# Patient Record
Sex: FEMALE | Race: BLACK OR AFRICAN AMERICAN | Employment: FULL TIME | URBAN - NONMETROPOLITAN AREA
[De-identification: names, ages, dates, MRNs, and addresses within clinical notes are randomized per-mention and may not be internally consistent; named-entity substitution may affect disease eponyms.]

---

## 2018-05-04 ENCOUNTER — HOSPITAL ENCOUNTER (EMERGENCY)
Age: 43
Discharge: LEFT BEFORE DISCHARGE | End: 2018-05-04

## 2022-05-01 ENCOUNTER — HOSPITAL ENCOUNTER (EMERGENCY)
Age: 47
Discharge: HOME | End: 2022-05-01
Attending: FAMILY MEDICINE
Payer: COMMERCIAL

## 2022-05-01 VITALS
WEIGHT: 155 LBS | RESPIRATION RATE: 19 BRPM | BODY MASS INDEX: 26.46 KG/M2 | HEIGHT: 64 IN | HEART RATE: 83 BPM | TEMPERATURE: 97.9 F | DIASTOLIC BLOOD PRESSURE: 70 MMHG | OXYGEN SATURATION: 98 % | SYSTOLIC BLOOD PRESSURE: 146 MMHG

## 2022-05-01 DIAGNOSIS — R11.2 NON-INTRACTABLE VOMITING WITH NAUSEA, UNSPECIFIED VOMITING TYPE: ICD-10-CM

## 2022-05-01 DIAGNOSIS — R51.9 ACUTE NONINTRACTABLE HEADACHE, UNSPECIFIED HEADACHE TYPE: Primary | ICD-10-CM

## 2022-05-01 LAB
ALBUMIN SERPL-MCNC: 3.5 GM/DL (ref 3.4–5)
ALP BLD-CCNC: 91 U/L (ref 46–116)
ALT SERPL-CCNC: 21 U/L (ref 14–63)
ANION GAP: 10 MEQ/L (ref 8–16)
AST SERPL-CCNC: 16 U/L (ref 15–37)
BASOPHILS # BLD: 0.9 % (ref 0–3)
BILIRUB SERPL-MCNC: 0.3 MG/DL (ref 0.2–1)
BUN BLDV-MCNC: 19 MG/DL (ref 7–18)
CHLORIDE BLD-SCNC: 108 MEQ/L (ref 98–107)
CO2: 23 MEQ/L (ref 21–32)
CREAT SERPL-MCNC: 0.8 MG/DL (ref 0.6–1.3)
EOSINOPHILS RELATIVE PERCENT: 3.1 % (ref 0–4)
GFR, ESTIMATED: > 90 ML/MIN/1.73M2
GLUCOSE BLD-MCNC: 112 MG/DL (ref 74–106)
HCT VFR BLD CALC: 44.2 % (ref 37–47)
HEMOGLOBIN: 14.9 GM/DL (ref 12–16)
LIPASE: 116 U/L (ref 73–393)
LYMPHOCYTES # BLD: 24.5 % (ref 15–47)
MCH RBC QN AUTO: 31.9 PG (ref 27–31)
MCHC RBC AUTO-ENTMCNC: 33.7 GM/DL (ref 33–37)
MCV RBC AUTO: 94.6 FL (ref 81–99)
MONOCYTES: 7.4 % (ref 0–12)
PDW BLD-RTO: 13 % (ref 11.5–14.5)
PLATELET # BLD: 261 THOU/MM3 (ref 130–400)
PMV BLD AUTO: 8.5 FL (ref 7.4–10.4)
POC CALCIUM: 8.9 MG/DL (ref 8.5–10.1)
POTASSIUM SERPL-SCNC: 4 MEQ/L (ref 3.5–5.1)
RBC # BLD: 4.67 MILL/MM3 (ref 4.2–5.4)
SEGS: 64.1 % (ref 43–75)
SODIUM BLD-SCNC: 141 MEQ/L (ref 136–145)
TOTAL PROTEIN: 6.8 GM/DL (ref 6.4–8.2)
WBC # BLD: 8.5 THOU/MM3 (ref 4.8–10.8)

## 2022-05-01 PROCEDURE — 6360000002 HC RX W HCPCS: Performed by: FAMILY MEDICINE

## 2022-05-01 PROCEDURE — 83690 ASSAY OF LIPASE: CPT

## 2022-05-01 PROCEDURE — 2580000003 HC RX 258: Performed by: FAMILY MEDICINE

## 2022-05-01 PROCEDURE — 96374 THER/PROPH/DIAG INJ IV PUSH: CPT

## 2022-05-01 PROCEDURE — 99284 EMERGENCY DEPT VISIT MOD MDM: CPT

## 2022-05-01 PROCEDURE — 96375 TX/PRO/DX INJ NEW DRUG ADDON: CPT

## 2022-05-01 PROCEDURE — 80053 COMPREHEN METABOLIC PANEL: CPT

## 2022-05-01 PROCEDURE — 85025 COMPLETE CBC W/AUTO DIFF WBC: CPT

## 2022-05-01 RX ORDER — DIPHENHYDRAMINE HYDROCHLORIDE 50 MG/ML
25 INJECTION INTRAMUSCULAR; INTRAVENOUS ONCE
Status: COMPLETED | OUTPATIENT
Start: 2022-05-01 | End: 2022-05-01

## 2022-05-01 RX ORDER — ONDANSETRON 2 MG/ML
4 INJECTION INTRAMUSCULAR; INTRAVENOUS ONCE
Status: COMPLETED | OUTPATIENT
Start: 2022-05-01 | End: 2022-05-01

## 2022-05-01 RX ORDER — KETOROLAC TROMETHAMINE 30 MG/ML
15 INJECTION, SOLUTION INTRAMUSCULAR; INTRAVENOUS ONCE
Status: COMPLETED | OUTPATIENT
Start: 2022-05-01 | End: 2022-05-01

## 2022-05-01 RX ORDER — 0.9 % SODIUM CHLORIDE 0.9 %
1000 INTRAVENOUS SOLUTION INTRAVENOUS ONCE
Status: COMPLETED | OUTPATIENT
Start: 2022-05-01 | End: 2022-05-01

## 2022-05-01 RX ORDER — METOCLOPRAMIDE HYDROCHLORIDE 5 MG/ML
10 INJECTION INTRAMUSCULAR; INTRAVENOUS ONCE
Status: COMPLETED | OUTPATIENT
Start: 2022-05-01 | End: 2022-05-01

## 2022-05-01 RX ORDER — PROMETHAZINE HYDROCHLORIDE 25 MG/1
25 TABLET ORAL EVERY 6 HOURS PRN
Qty: 15 TABLET | Refills: 0 | Status: SHIPPED | OUTPATIENT
Start: 2022-05-01 | End: 2022-05-05

## 2022-05-01 RX ADMIN — ONDANSETRON 4 MG: 2 INJECTION INTRAMUSCULAR; INTRAVENOUS at 07:43

## 2022-05-01 RX ADMIN — DIPHENHYDRAMINE HYDROCHLORIDE 25 MG: 50 INJECTION, SOLUTION INTRAMUSCULAR; INTRAVENOUS at 07:26

## 2022-05-01 RX ADMIN — SODIUM CHLORIDE 1000 ML: 9 INJECTION, SOLUTION INTRAVENOUS at 07:27

## 2022-05-01 RX ADMIN — KETOROLAC TROMETHAMINE 15 MG: 30 INJECTION, SOLUTION INTRAMUSCULAR; INTRAVENOUS at 07:26

## 2022-05-01 RX ADMIN — METOCLOPRAMIDE 10 MG: 5 INJECTION, SOLUTION INTRAMUSCULAR; INTRAVENOUS at 07:26

## 2022-05-01 ASSESSMENT — PAIN DESCRIPTION - LOCATION
LOCATION: HEAD
LOCATION: HEAD

## 2022-05-01 ASSESSMENT — ENCOUNTER SYMPTOMS
SHORTNESS OF BREATH: 0
VOMITING: 1
ABDOMINAL PAIN: 0
SORE THROAT: 0
BACK PAIN: 0
NAUSEA: 1
PHOTOPHOBIA: 1
COUGH: 0
WHEEZING: 0
DIARRHEA: 0

## 2022-05-01 ASSESSMENT — PAIN DESCRIPTION - DESCRIPTORS
DESCRIPTORS: ACHING
DESCRIPTORS: ACHING

## 2022-05-01 ASSESSMENT — PAIN SCALES - GENERAL
PAINLEVEL_OUTOF10: 10
PAINLEVEL_OUTOF10: 2

## 2022-05-01 NOTE — ED NOTES
Pt stable A&O x 3 given discharge and follow up info. Pt voiced no concerns and discharged from ER to self to home. Pt ambulated out of ER with no complications .        Chai Shah RN  05/01/22 9471

## 2022-05-01 NOTE — ED PROVIDER NOTES
[address]/[facility] COMPLAINT       Chief Complaint   Patient presents with    Headache    Emesis       Nurses Notes reviewed and I agree except as noted in the HPI. HISTORY OF PRESENT ILLNESS    Marian Montero is a 56 y.o. female who presents for evaluation of cute onset of headache, nausea, and vomiting. Patient states that she woke at 4 AM with her symptoms. Headache is noted behind the eyes and in the frontal region. She rates her pain a 10/10 in severity. Patient dates that she has vomited multiple times since onset. She does not particularly have abdominal pain but most notes the nausea. No fevers or chills. No URI symptoms. She does have some photophobia and phonophobia. Patient denies history of migraine headaches. REVIEW OF SYSTEMS     Review of Systems   Constitutional: Negative for activity change, appetite change, chills and fever. HENT: Negative for ear pain and sore throat. (+) phonophobia   Eyes: Positive for photophobia. Negative for visual disturbance. Respiratory: Negative for cough, shortness of breath and wheezing. Cardiovascular: Negative for chest pain and leg swelling. Gastrointestinal: Positive for nausea and vomiting. Negative for abdominal pain and diarrhea. Genitourinary: Negative for dysuria, flank pain and hematuria. Musculoskeletal: Negative for arthralgias, back pain, gait problem and neck pain. Skin: Negative for rash and wound. Neurological: Positive for headaches. Negative for weakness and light-headedness. Psychiatric/Behavioral: Negative for agitation and hallucinations. The patient is not nervous/anxious. PAST MEDICAL HISTORY    has a past medical history of Cancer ([facility] [address] SURGICAL HISTORY      has a past surgical history that includes Hysterectomy.     CURRENT MEDICATIONS       Previous Medications    No medications on file       ALLERGIES     has No Known Allergies. FAMILY HISTORY     has no family status information on file. family history is not on file. SOCIAL HISTORY      reports that she has been smoking. She has a 15.00 pack-year smoking history. She has never used smokeless tobacco. She reports that she does not drink alcohol and does not use drugs. PHYSICAL EXAM     INITIAL VITALS:  height is 5' 4\" (1.626 m) and weight is 155 lb (70.3 kg). Her temporal temperature is 97.9 °F (36.6 °C). Her blood pressure is 145/80 (abnormal) and her pulse is 80. Her respiration is 21 and oxygen saturation is 95%. Physical Exam  Vitals and nursing note reviewed. Constitutional:       General: She is not in acute distress. Appearance: She is well-developed. HENT:      Head: Normocephalic and atraumatic. Eyes:      General:         Right eye: No discharge. Left eye: No discharge. Extraocular Movements: Extraocular movements intact. Conjunctiva/sclera: Conjunctivae normal.      Pupils: Pupils are equal, round, and reactive to light. Cardiovascular:      Rate and Rhythm: Normal rate and regular rhythm. Heart sounds: Normal heart sounds. Pulmonary:      Effort: Pulmonary effort is normal.      Breath sounds: Normal breath sounds. Abdominal:      General: Bowel sounds are normal. There is no distension. Palpations: Abdomen is soft. There is no mass. Tenderness: There is no abdominal tenderness. Skin:     General: Skin is warm and dry. Neurological:      Mental Status: She is alert and oriented to person, place, and time. Cranial Nerves: No cranial nerve deficit. Sensory: No sensory deficit. Motor: No weakness.       Coordination: Coordination normal.   Psychiatric:         Mood and Affect: Mood normal.         Behavior: Behavior normal.         DIFFERENTIAL DIAGNOSIS:   Migraine headache, cluster headache, tension headache, viral syndrome, small bowel obstruction, pancreatitis    DIAGNOSTIC RESULTS RADIOLOGY: non-plain filmimages(s) such as CT, Ultrasound and MRI are read by the radiologist.  No orders to display         LABS:   Labs Reviewed   CBC WITH AUTO DIFFERENTIAL - Abnormal; Notable for the following components:       Result Value    MCH 31.9 (*)     All other components within normal limits   COMPREHENSIVE METABOLIC PANEL - Abnormal; Notable for the following components:    Glucose 112 (*)     BUN 19 (*)     Chloride 108 (*)     All other components within normal limits   LIPASE   GLOMERULAR FILTRATION RATE, ESTIMATED   ANION GAP       DEPARTMENT COURSE:   Vitals:    Vitals:    05/01/22 0703   BP: (!) 145/80   Pulse: 80   Resp: 21   Temp: 97.9 °F (36.6 °C)   TempSrc: Temporal   SpO2: 95%   Weight: 155 lb (70.3 kg)   Height: 5' 4\" (1.626 m)       MDM:  Patient resents for evaluation of acute headache, nausea, and vomiting. She is given Toradol, Reglan, Benadryl, Zofran, and 0.9% normal saline IV fluids. Patient ultimately had headache relief after entire concoction provided. CO2 was noted to be decreased but should improve after administration of IV fluids. She is recommend to follow with her PCP as needed or return to department for symptom worsening or for evaluation of new concerning symptoms. She was prescribed Phenergan for use if nausea and vomiting persist.    CRITICAL CARE:   None    CONSULTS:  None    PROCEDURES:  None    FINAL IMPRESSION      1. Acute nonintractable headache, unspecified headache type    2.  Non-intractable vomiting with nausea, unspecified vomiting type          DISPOSITION/PLAN   Discharge    PATIENT REFERRED TO:  Bee Antony MD  [address]     Schedule an appointment as soon as possible for a visit in 3 days  As needed      DISCHARGEMEDICATIONS:  New Prescriptions    PROMETHAZINE (PHENERGAN) 25 MG TABLET    Take 1 tablet by mouth every 6 hours as needed for Nausea       (Please note that portions of this note were completedwith a voice recognition program.  Efforts were made to edit the dictations but occasionally words are mis-transcribed.)    MD Sangeeta Abdalla MD  05/01/22 0711

## 2022-05-01 NOTE — ED TRIAGE NOTES
Pt comes into ER room 6 for a headache 10/10 that woke her up at 0400 and since then she has vomited several times as well. Her headache is all frontal lobes and she his photophobic and loud noise sensitive, She was given a vomit bag and assisted up into bed.

## 2023-01-15 ENCOUNTER — HOSPITAL ENCOUNTER (EMERGENCY)
Age: 48
Discharge: HOME | End: 2023-01-15
Attending: FAMILY MEDICINE
Payer: COMMERCIAL

## 2023-01-15 VITALS
TEMPERATURE: 97.3 F | RESPIRATION RATE: 18 BRPM | OXYGEN SATURATION: 99 % | HEART RATE: 71 BPM | SYSTOLIC BLOOD PRESSURE: 133 MMHG | DIASTOLIC BLOOD PRESSURE: 75 MMHG

## 2023-01-15 DIAGNOSIS — G43.009 MIGRAINE WITHOUT AURA AND WITHOUT STATUS MIGRAINOSUS, NOT INTRACTABLE: Primary | ICD-10-CM

## 2023-01-15 PROCEDURE — 6360000002 HC RX W HCPCS: Performed by: FAMILY MEDICINE

## 2023-01-15 PROCEDURE — 96375 TX/PRO/DX INJ NEW DRUG ADDON: CPT

## 2023-01-15 PROCEDURE — 2580000003 HC RX 258: Performed by: FAMILY MEDICINE

## 2023-01-15 PROCEDURE — 96374 THER/PROPH/DIAG INJ IV PUSH: CPT

## 2023-01-15 PROCEDURE — 99284 EMERGENCY DEPT VISIT MOD MDM: CPT

## 2023-01-15 RX ORDER — 0.9 % SODIUM CHLORIDE 0.9 %
1000 INTRAVENOUS SOLUTION INTRAVENOUS ONCE
Status: COMPLETED | OUTPATIENT
Start: 2023-01-15 | End: 2023-01-15

## 2023-01-15 RX ORDER — METOCLOPRAMIDE HYDROCHLORIDE 5 MG/ML
10 INJECTION INTRAMUSCULAR; INTRAVENOUS ONCE
Status: COMPLETED | OUTPATIENT
Start: 2023-01-15 | End: 2023-01-15

## 2023-01-15 RX ORDER — DIPHENHYDRAMINE HYDROCHLORIDE 50 MG/ML
25 INJECTION INTRAMUSCULAR; INTRAVENOUS ONCE
Status: COMPLETED | OUTPATIENT
Start: 2023-01-15 | End: 2023-01-15

## 2023-01-15 RX ORDER — KETOROLAC TROMETHAMINE 30 MG/ML
30 INJECTION, SOLUTION INTRAMUSCULAR; INTRAVENOUS ONCE
Status: COMPLETED | OUTPATIENT
Start: 2023-01-15 | End: 2023-01-15

## 2023-01-15 RX ADMIN — KETOROLAC TROMETHAMINE 30 MG: 30 INJECTION, SOLUTION INTRAMUSCULAR; INTRAVENOUS at 11:23

## 2023-01-15 RX ADMIN — SODIUM CHLORIDE 1000 ML: 9 INJECTION, SOLUTION INTRAVENOUS at 11:21

## 2023-01-15 RX ADMIN — DIPHENHYDRAMINE HYDROCHLORIDE 25 MG: 50 INJECTION, SOLUTION INTRAMUSCULAR; INTRAVENOUS at 11:22

## 2023-01-15 RX ADMIN — METOCLOPRAMIDE 10 MG: 5 INJECTION, SOLUTION INTRAMUSCULAR; INTRAVENOUS at 11:24

## 2023-01-15 ASSESSMENT — ENCOUNTER SYMPTOMS
SORE THROAT: 0
VOMITING: 0
NAUSEA: 1

## 2023-01-15 ASSESSMENT — PAIN SCALES - GENERAL
PAINLEVEL_OUTOF10: 2
PAINLEVEL_OUTOF10: 8
PAINLEVEL_OUTOF10: 3
PAINLEVEL_OUTOF10: 8
PAINLEVEL_OUTOF10: 2

## 2023-01-15 ASSESSMENT — PAIN - FUNCTIONAL ASSESSMENT
PAIN_FUNCTIONAL_ASSESSMENT: 0-10

## 2023-01-15 ASSESSMENT — PAIN DESCRIPTION - ORIENTATION: ORIENTATION: ANTERIOR

## 2023-01-15 ASSESSMENT — PAIN DESCRIPTION - LOCATION
LOCATION: HEAD

## 2023-01-15 NOTE — ED PROVIDER NOTES
[facility]  eMERGENCY dEPARTMENT eNCOUnter          CHIEF COMPLAINT       Chief Complaint   Patient presents with    Migraine    Emesis       Nurses Notes reviewed and I agree except as noted in the HPI. HISTORY OF PRESENT ILLNESS    Melina Christopher is a 56 y.o. female who presents for headache. Patient notes that onset of symptoms was early this morning. She rates her headache 8 out of 10. The headache seems to focus in her forehead and extends back into her middle of her scalp. She notes that the pain is pressure-like. She she notes efforts to use promethazine at home for some associated nausea with the headache and she vomited. She denies any thunderclap events. Notes that the headache was gradual and is worsened. She has had headaches before approximately 2 months ago that were similar in character. She denies any motor or sensory changes. She denies any vision changes. She denies any facial pain. Denies any dental pain. Denies any nasal congestion. REVIEW OF SYSTEMS     Review of Systems   Constitutional:  Negative for chills and fever. HENT:  Negative for congestion, ear pain and sore throat. Gastrointestinal:  Positive for nausea. Negative for vomiting. Musculoskeletal:  Negative for arthralgias, myalgias, neck pain and neck stiffness. Neurological:  Positive for headaches. Negative for dizziness, seizures, syncope, weakness and light-headedness. All other systems reviewed and are negative. PAST MEDICAL HISTORY    has a past medical history of Cancer ([address] SURGICAL HISTORY      has a past surgical history that includes Hysterectomy. CURRENT MEDICATIONS       Previous Medications    No medications on file       ALLERGIES     has No Known Allergies. FAMILY HISTORY     has no family status information on file. family history is not on file. SOCIAL HISTORY      reports that she has been smoking. She has a 15.00 pack-year smoking history.  She has never used smokeless tobacco. She reports that she does not drink alcohol and does not use drugs. PHYSICAL EXAM     INITIAL VITALS:  temperature is 97.3 °F (36.3 °C). Her blood pressure is 133/75 and her pulse is 71. Her respiration is 18 and oxygen saturation is 99%. Physical Exam  Vitals and nursing note reviewed. Constitutional:       General: She is in acute distress. HENT:      Head: Normocephalic and atraumatic. Right Ear: Tympanic membrane normal.      Left Ear: Tympanic membrane normal.      Nose: Nose normal. No congestion. Mouth/Throat:      Pharynx: Oropharynx is clear. No oropharyngeal exudate or posterior oropharyngeal erythema. Eyes:      Conjunctiva/sclera: Conjunctivae normal.      Pupils: Pupils are equal, round, and reactive to light. Funduscopic exam:     Right eye: No papilledema. Left eye: No papilledema. Musculoskeletal:      Cervical back: Neck supple. No rigidity. Skin:     General: Skin is dry. Findings: No erythema or rash. Neurological:      General: No focal deficit present. Mental Status: She is alert and oriented to person, place, and time. Cranial Nerves: No cranial nerve deficit. Sensory: No sensory deficit. Motor: Motor function is intact. Coordination: Coordination is intact. Gait: Gait is intact. Psychiatric:         Mood and Affect: Mood normal.         Behavior: Behavior normal.        DIFFERENTIAL DIAGNOSIS:   Migraine,tension,cluster headache. DIAGNOSTIC RESULTS         LABS:   Labs Reviewed - No data to display    EMERGENCY DEPARTMENT COURSE:   Vitals:    Vitals:    01/15/23 1058 01/15/23 1219   BP: 139/81 133/75   Pulse: 76 71   Resp: 18 18   Temp: 97.3 °F (36.3 °C)    SpO2: 97% 99%     Frontal headache which radiates back to occipital area. Mild nausea. Light sensitivity. History is not suggestive for thunderclap event. Symptoms have gradually increased since this morning.  No focal findings with ears,sinuses,throat. No nuchal rigidity. No fever. NS bolus provided with suggestion of migraine like headache. Toradol,benadryl, and Reglan IV given. After waiting period. Headache improved to 2/10. Patient notes feeling much better. Discussed follow up and headache diary. Patient voice understanding. Differential diagnosis includes: SUBARACHNOID HEMORRHAGE, MENINGITIS, INTRACRANIAL HEMORRHAGE, SUBDURAL OR EPIDURAL HEMATOMA, OR STROKE, I estimate there is LOW risk for thus and I consider the discharge disposition reasonable. The patient and/or family and I have discussed the diagnosis and risks, and we agree with discharging home to follow-up with their primary doctor. We also discussed returning to the Emergency Department immediately if new or worsening symptoms occur. We have discussed the symptoms which are most concerning (e.g., changing or worsening pain, weakness, vomiting, fever) that necessitate immediate return. PROCEDURES:  None    FINAL IMPRESSION      1. Migraine without aura and without status migrainosus, not intractable          DISPOSITION/PLAN   Home. Care instructions provided. Follow up with PCP. If headache worsens than return to ED.      PATIENT REFERRED TO:  Adrienne Gomez MD  [place] [id number] [address] [place] [phone]    Schedule an appointment as soon as possible for a visit       DISCHARGE MEDICATIONS:  New Prescriptions    No medications on file       (Please note that portions of this note were completed with a voice recognition program.  Efforts were made to edit the dictations but occasionally words are mis-transcribed.)    MD Michelle Doll MD  01/15/23 3784

## 2023-01-15 NOTE — ED NOTES
Started today with a headache and nausea. Tried to take nausea medication but vomited that. Patient is alert and cooperative. Skin warm and dry. Color normal for ethnicity. Spouse here with her.      Angelika Garcia, VINCENT  01/15/23 7181

## 2023-01-15 NOTE — ED NOTES
Discharge instructions reviewed with patient and spouse, questions answered. Skin warm and dry, color normal for ethnicity. Remains alert and cooperative. Denies further questions or concerns at this time.       Severa Ego, RN  01/15/23 1475

## 2025-03-30 ENCOUNTER — HOSPITAL ENCOUNTER (EMERGENCY)
Age: 50
Discharge: HOME | End: 2025-03-30
Attending: EMERGENCY MEDICINE
Payer: COMMERCIAL

## 2025-03-30 VITALS
DIASTOLIC BLOOD PRESSURE: 73 MMHG | OXYGEN SATURATION: 100 % | RESPIRATION RATE: 14 BRPM | SYSTOLIC BLOOD PRESSURE: 155 MMHG | HEART RATE: 79 BPM | TEMPERATURE: 97.5 F

## 2025-03-30 DIAGNOSIS — G43.809 OTHER MIGRAINE WITHOUT STATUS MIGRAINOSUS, NOT INTRACTABLE: Primary | ICD-10-CM

## 2025-03-30 PROCEDURE — 2580000003 HC RX 258: Performed by: EMERGENCY MEDICINE

## 2025-03-30 PROCEDURE — 6360000002 HC RX W HCPCS: Performed by: EMERGENCY MEDICINE

## 2025-03-30 PROCEDURE — 96374 THER/PROPH/DIAG INJ IV PUSH: CPT

## 2025-03-30 PROCEDURE — 96375 TX/PRO/DX INJ NEW DRUG ADDON: CPT

## 2025-03-30 PROCEDURE — 99284 EMERGENCY DEPT VISIT MOD MDM: CPT

## 2025-03-30 RX ORDER — PROCHLORPERAZINE EDISYLATE 5 MG/ML
10 INJECTION INTRAMUSCULAR; INTRAVENOUS EVERY 6 HOURS PRN
Status: DISCONTINUED | OUTPATIENT
Start: 2025-03-30 | End: 2025-03-30 | Stop reason: HOSPADM

## 2025-03-30 RX ORDER — DIPHENHYDRAMINE HYDROCHLORIDE 50 MG/ML
25 INJECTION, SOLUTION INTRAMUSCULAR; INTRAVENOUS ONCE
Status: COMPLETED | OUTPATIENT
Start: 2025-03-30 | End: 2025-03-30

## 2025-03-30 RX ORDER — 0.9 % SODIUM CHLORIDE 0.9 %
1000 INTRAVENOUS SOLUTION INTRAVENOUS ONCE
Status: COMPLETED | OUTPATIENT
Start: 2025-03-30 | End: 2025-03-30

## 2025-03-30 RX ORDER — KETOROLAC TROMETHAMINE 30 MG/ML
30 INJECTION, SOLUTION INTRAMUSCULAR; INTRAVENOUS ONCE
Status: COMPLETED | OUTPATIENT
Start: 2025-03-30 | End: 2025-03-30

## 2025-03-30 RX ADMIN — KETOROLAC TROMETHAMINE 30 MG: 30 INJECTION, SOLUTION INTRAMUSCULAR at 07:53

## 2025-03-30 RX ADMIN — PROCHLORPERAZINE EDISYLATE 10 MG: 5 INJECTION INTRAMUSCULAR; INTRAVENOUS at 07:53

## 2025-03-30 RX ADMIN — DIPHENHYDRAMINE HYDROCHLORIDE 25 MG: 50 INJECTION INTRAMUSCULAR; INTRAVENOUS at 07:53

## 2025-03-30 RX ADMIN — SODIUM CHLORIDE 1000 ML: 0.9 INJECTION, SOLUTION INTRAVENOUS at 07:51

## 2025-03-30 ASSESSMENT — PAIN SCALES - GENERAL
PAINLEVEL_OUTOF10: 10
PAINLEVEL_OUTOF10: 4

## 2025-03-30 ASSESSMENT — PAIN - FUNCTIONAL ASSESSMENT: PAIN_FUNCTIONAL_ASSESSMENT: 0-10

## 2025-03-30 NOTE — ED NOTES
Discharge teaching and instructions for condition explained to patient.  AVS reviewed.  Patient voiced understanding regarding follow up appointments and care of self at home.  Pt discharged to home in stable condition per self.

## 2025-03-30 NOTE — ED NOTES
Patient presents to the ED via private auto with complaints of migraine that she woke up with this morning.  States that she will usually get migraines a few times a year but ran out of her Ubrelvy at home. Patient is nauseated and has photosensitivity.

## 2025-03-30 NOTE — ED PROVIDER NOTES
[facility] EMERGENCY SERVICES ENCOUNTER        PATIENT NAME: Cynthia Cordova  MRN: [id number]  : 1975  FARAH: 3/30/2025  PROVIDER: Carloz Leslie MD    Patient was seen and evaluated at 8:39 AM EDT. Nurses Notes are reviewed and I agree except as noted in the HPI.  Chief Complaint   Patient presents with    Migraine     HISTORY OF PRESENT ILLNESS     A 50 y.o. female with PMH of migraine presents with headache since 5 AM today.    Not her worst headache.  Headache is similar to what she had before with migraine flare-up.  She has no fever or chills.  No neck pain.  No neck stiffness.  No seizure.  No confusion.  She has nausea, vomiting, and photophobia.  No blurry vision.  No slurred speech.  No extremity weakness    This HPI was provided by patient.     PAST MEDICAL HISTORY    has a past medical history of Cancer ([facility]).    SURGICAL HISTORY      has a past surgical history that includes Hysterectomy.    CURRENT MEDICATIONS       Previous Medications    No medications on file       ALLERGIES     has no known allergies.    FAMILY HISTORY     has no family status information on file.    family history is not on file.    SOCIAL HISTORY      reports that she has been smoking. She has a 15 pack-year smoking history. She has never used smokeless tobacco. She reports that she does not drink alcohol and does not use drugs.    PHYSICAL EXAM      oral temperature is 97.5 °F (36.4 °C). Her blood pressure is 155/73 (abnormal) and her pulse is 79. Her respiration is 14 and oxygen saturation is 100%.   Physical Exam  Vitals and nursing note reviewed.   Constitutional:       Appearance: Normal appearance. She is well-developed.   HENT:      Head: Normocephalic and atraumatic.      Nose: Nose normal.      Mouth/Throat:      Mouth: Mucous membranes are moist.   Eyes:      General: No scleral icterus.        Right eye: No discharge.         Left eye: No discharge.      Pupils: Pupils are equal, round, and reactive